# Patient Record
Sex: FEMALE | Race: WHITE | URBAN - METROPOLITAN AREA
[De-identification: names, ages, dates, MRNs, and addresses within clinical notes are randomized per-mention and may not be internally consistent; named-entity substitution may affect disease eponyms.]

---

## 2018-11-16 ENCOUNTER — TELEPHONE (OUTPATIENT)
Dept: INTERNAL MEDICINE CLINIC | Age: 63
End: 2018-11-16

## 2018-11-27 ENCOUNTER — OFFICE VISIT (OUTPATIENT)
Dept: INTERNAL MEDICINE CLINIC | Age: 63
End: 2018-11-27
Payer: MEDICAID

## 2018-11-27 VITALS
SYSTOLIC BLOOD PRESSURE: 128 MMHG | WEIGHT: 140 LBS | HEIGHT: 60 IN | OXYGEN SATURATION: 99 % | RESPIRATION RATE: 20 BRPM | DIASTOLIC BLOOD PRESSURE: 82 MMHG | HEART RATE: 105 BPM | BODY MASS INDEX: 27.48 KG/M2 | TEMPERATURE: 98 F

## 2018-11-27 DIAGNOSIS — Z13.220 SCREENING FOR HYPERLIPIDEMIA: ICD-10-CM

## 2018-11-27 DIAGNOSIS — S06.9X9S TRAUMATIC BRAIN INJURY WITH LOSS OF CONSCIOUSNESS, SEQUELA (HCC): ICD-10-CM

## 2018-11-27 DIAGNOSIS — M81.0 OSTEOPOROSIS WITHOUT CURRENT PATHOLOGICAL FRACTURE, UNSPECIFIED OSTEOPOROSIS TYPE: ICD-10-CM

## 2018-11-27 DIAGNOSIS — Z80.8 FAMILY HISTORY OF THYROID CANCER: ICD-10-CM

## 2018-11-27 DIAGNOSIS — F43.10 PTSD (POST-TRAUMATIC STRESS DISORDER): ICD-10-CM

## 2018-11-27 DIAGNOSIS — Z11.59 NEED FOR HEPATITIS C SCREENING TEST: ICD-10-CM

## 2018-11-27 DIAGNOSIS — E55.9 VITAMIN D DEFICIENCY: ICD-10-CM

## 2018-11-27 DIAGNOSIS — I10 ESSENTIAL HYPERTENSION: Primary | ICD-10-CM

## 2018-11-27 DIAGNOSIS — Z13.29 SCREENING FOR THYROID DISORDER: ICD-10-CM

## 2018-11-27 DIAGNOSIS — Z86.59 HISTORY OF DEPRESSION: ICD-10-CM

## 2018-11-27 DIAGNOSIS — Z85.3 HISTORY OF LEFT BREAST CANCER: ICD-10-CM

## 2018-11-27 PROBLEM — S06.9X9A TRAUMATIC BRAIN INJURY WITH LOSS OF CONSCIOUSNESS (HCC): Status: ACTIVE | Noted: 2018-11-27

## 2018-11-27 PROCEDURE — 96160 PT-FOCUSED HLTH RISK ASSMT: CPT | Performed by: NURSE PRACTITIONER

## 2018-11-27 PROCEDURE — G8484 FLU IMMUNIZE NO ADMIN: HCPCS | Performed by: NURSE PRACTITIONER

## 2018-11-27 PROCEDURE — G8419 CALC BMI OUT NRM PARAM NOF/U: HCPCS | Performed by: NURSE PRACTITIONER

## 2018-11-27 PROCEDURE — 99204 OFFICE O/P NEW MOD 45 MIN: CPT | Performed by: NURSE PRACTITIONER

## 2018-11-27 PROCEDURE — G8427 DOCREV CUR MEDS BY ELIG CLIN: HCPCS | Performed by: NURSE PRACTITIONER

## 2018-11-27 PROCEDURE — 3017F COLORECTAL CA SCREEN DOC REV: CPT | Performed by: NURSE PRACTITIONER

## 2018-11-27 PROCEDURE — 1036F TOBACCO NON-USER: CPT | Performed by: NURSE PRACTITIONER

## 2018-11-27 RX ORDER — CALCITONIN SALMON 200 [IU]/.09ML
1 SPRAY, METERED NASAL DAILY
Qty: 3 BOTTLE | Refills: 3 | Status: SHIPPED | OUTPATIENT
Start: 2018-11-27

## 2018-11-27 RX ORDER — DULOXETIN HYDROCHLORIDE 60 MG/1
60 CAPSULE, DELAYED RELEASE ORAL 2 TIMES DAILY
COMMUNITY

## 2018-11-27 RX ORDER — ZOLPIDEM TARTRATE 10 MG/1
10 TABLET ORAL
COMMUNITY

## 2018-11-27 RX ORDER — CALCITONIN SALMON 200 [IU]/.09ML
1 SPRAY, METERED NASAL
COMMUNITY
Start: 2013-10-30 | End: 2018-11-27 | Stop reason: SDUPTHER

## 2018-11-27 RX ORDER — MULTIVIT WITH MINERALS/LUTEIN
1000 TABLET ORAL DAILY
COMMUNITY

## 2018-11-27 RX ORDER — ESCITALOPRAM OXALATE 20 MG/1
40 TABLET ORAL
COMMUNITY
Start: 2017-09-23

## 2018-11-27 RX ORDER — METHYLPHENIDATE HYDROCHLORIDE 10 MG/1
20 TABLET ORAL
COMMUNITY

## 2018-11-27 RX ORDER — ERGOCALCIFEROL 1.25 MG/1
50000 CAPSULE ORAL WEEKLY
Qty: 12 CAPSULE | Refills: 3 | Status: SHIPPED | OUTPATIENT
Start: 2018-11-27

## 2018-11-27 RX ORDER — ALPRAZOLAM 0.5 MG/1
0.5 TABLET ORAL
COMMUNITY
Start: 2017-08-23

## 2018-11-27 RX ORDER — CLINDAMYCIN PHOSPHATE 11.9 MG/ML
1 SOLUTION TOPICAL 2 TIMES DAILY
COMMUNITY

## 2018-11-27 RX ORDER — ERGOCALCIFEROL 1.25 MG/1
50000 CAPSULE ORAL
COMMUNITY
End: 2018-11-27 | Stop reason: SDUPTHER

## 2018-11-27 RX ORDER — TRAZODONE HYDROCHLORIDE 100 MG/1
300 TABLET ORAL
COMMUNITY
Start: 2017-10-02

## 2018-11-27 RX ORDER — LITHIUM CARBONATE 450 MG
450 TABLET, EXTENDED RELEASE ORAL DAILY
COMMUNITY

## 2018-11-27 RX ORDER — BUPROPION HYDROCHLORIDE 200 MG/1
200 TABLET, EXTENDED RELEASE ORAL
COMMUNITY
Start: 2017-09-23

## 2018-11-27 RX ORDER — LISINOPRIL 5 MG/1
5 TABLET ORAL DAILY
COMMUNITY

## 2018-11-27 ASSESSMENT — PATIENT HEALTH QUESTIONNAIRE - PHQ9
7. TROUBLE CONCENTRATING ON THINGS, SUCH AS READING THE NEWSPAPER OR WATCHING TELEVISION: 3
10. IF YOU CHECKED OFF ANY PROBLEMS, HOW DIFFICULT HAVE THESE PROBLEMS MADE IT FOR YOU TO DO YOUR WORK, TAKE CARE OF THINGS AT HOME, OR GET ALONG WITH OTHER PEOPLE: 2
SUM OF ALL RESPONSES TO PHQ QUESTIONS 1-9: 11
4. FEELING TIRED OR HAVING LITTLE ENERGY: 3
3. TROUBLE FALLING OR STAYING ASLEEP: 0
1. LITTLE INTEREST OR PLEASURE IN DOING THINGS: 1
8. MOVING OR SPEAKING SO SLOWLY THAT OTHER PEOPLE COULD HAVE NOTICED. OR THE OPPOSITE, BEING SO FIGETY OR RESTLESS THAT YOU HAVE BEEN MOVING AROUND A LOT MORE THAN USUAL: 0
9. THOUGHTS THAT YOU WOULD BE BETTER OFF DEAD, OR OF HURTING YOURSELF: 0
6. FEELING BAD ABOUT YOURSELF - OR THAT YOU ARE A FAILURE OR HAVE LET YOURSELF OR YOUR FAMILY DOWN: 0
SUM OF ALL RESPONSES TO PHQ9 QUESTIONS 1 & 2: 3
5. POOR APPETITE OR OVEREATING: 2
2. FEELING DOWN, DEPRESSED OR HOPELESS: 2
SUM OF ALL RESPONSES TO PHQ QUESTIONS 1-9: 11

## 2018-11-27 ASSESSMENT — ENCOUNTER SYMPTOMS: SHORTNESS OF BREATH: 0

## 2018-11-27 NOTE — PROGRESS NOTES
Coronary Art Dis Father         CABG x3; defibrillator; pacer    Cancer Sister         thyroid    Depression Sister     Depression Brother     Hypertension Sister     Bipolar Disorder Sister      Social History   Substance Use Topics    Smoking status: Never Smoker    Smokeless tobacco: Never Used    Alcohol use Yes      Comment: Occasionally      Allergies   Allergen Reactions    Phenothiazines      Other reaction(s): Other (See Comments)  Dyskinesia     Outpatient Prescriptions Marked as Taking for the 11/27/18 encounter (Office Visit) with BRYN Melchor - CNP   Medication Sig Dispense Refill    ALPRAZolam (XANAX) 0.5 MG tablet Take 0.5 mg by mouth. Miguel Angel Craig buPROPion (WELLBUTRIN SR) 200 MG extended release tablet Take 200 mg by mouth      calcitonin (MIACALCIN) 200 UNIT/ACT nasal spray 1 spray by Nasal route      vitamin D (ERGOCALCIFEROL) 64966 units CAPS capsule Take 50,000 Units by mouth      escitalopram (LEXAPRO) 20 MG tablet Take 40 mg by mouth      methylphenidate (RITALIN) 10 MG tablet Take 20 mg by mouth. Miguel Angel Craig traZODone (DESYREL) 100 MG tablet Take 300 mg by mouth      zolpidem (AMBIEN) 10 MG tablet Take 10 mg by mouth. Miguel Angel Carig lisinopril (PRINIVIL;ZESTRIL) 5 MG tablet Take 5 mg by mouth daily      lithium (ESKALITH) 450 MG extended release tablet Take 450 mg by mouth daily      DULoxetine (CYMBALTA) 60 MG extended release capsule Take 60 mg by mouth 2 times daily      clindamycin (CLEOCIN T) 1 % external solution Apply 1 Dose topically 2 times daily Apply topically 2 times daily.  vitamin E 1000 units capsule Take 1,000 Units by mouth daily         Health Maintenance Due   Topic    Hepatitis C screen     HIV screen     DTaP/Tdap/Td vaccine (1 - Tdap)    Cervical cancer screen     Lipid screen     Diabetes screen     Breast cancer screen - due. Last done in 2017.     Shingles Vaccine (1 of 2 - 2 Dose Series)    Colon cancer screen colonoscopy - she notes she has had a

## 2018-11-28 ASSESSMENT — ENCOUNTER SYMPTOMS: ABDOMINAL PAIN: 0

## 2018-11-30 RX ORDER — ESCITALOPRAM OXALATE 20 MG/1
40 TABLET ORAL
Qty: 30 TABLET | OUTPATIENT
Start: 2018-11-30

## 2019-03-20 ENCOUNTER — TELEPHONE (OUTPATIENT)
Dept: INTERNAL MEDICINE CLINIC | Age: 64
End: 2019-03-20

## 2019-03-20 DIAGNOSIS — Z12.11 COLON CANCER SCREENING: ICD-10-CM

## 2019-03-20 DIAGNOSIS — Z12.11 COLON CANCER SCREENING: Primary | ICD-10-CM
